# Patient Record
Sex: FEMALE | URBAN - METROPOLITAN AREA
[De-identification: names, ages, dates, MRNs, and addresses within clinical notes are randomized per-mention and may not be internally consistent; named-entity substitution may affect disease eponyms.]

---

## 2021-06-29 ENCOUNTER — OFFICE VISIT (OUTPATIENT)
Dept: URBAN - METROPOLITAN AREA CLINIC 68 | Facility: CLINIC | Age: 86
End: 2021-06-29
Payer: MEDICARE

## 2021-06-29 DIAGNOSIS — H35.3211 EXDTVE AGE-REL MCLR DEGN, RIGHT EYE, WITH ACTV CHRDL NEOVAS: Primary | ICD-10-CM

## 2021-06-29 DIAGNOSIS — H43.813 VITREOUS DEGENERATION, BILATERAL: ICD-10-CM

## 2021-06-29 PROCEDURE — 92134 CPTRZ OPH DX IMG PST SGM RTA: CPT | Performed by: OPHTHALMOLOGY

## 2021-06-29 PROCEDURE — 99204 OFFICE O/P NEW MOD 45 MIN: CPT | Performed by: OPHTHALMOLOGY

## 2021-06-29 ASSESSMENT — INTRAOCULAR PRESSURE
OS: 19
OD: 15

## 2021-06-29 NOTE — IMPRESSION/PLAN
Impression: Macular degeneration, regressed OD
s/p Avastin last 11/2020 (Dr. Car Crane) Plan: The patient is a poor historian. She reports getting an infection in the past, and has not received injections shalini 11/2020. Exam and OCT reveal a PED. Observe at this time. Thanks, [employer] Return in 2 months for follow up, OCT OU

## 2021-08-24 ENCOUNTER — OFFICE VISIT (OUTPATIENT)
Dept: URBAN - METROPOLITAN AREA CLINIC 68 | Facility: CLINIC | Age: 86
End: 2021-08-24
Payer: MEDICARE

## 2021-08-24 PROCEDURE — 92134 CPTRZ OPH DX IMG PST SGM RTA: CPT | Performed by: OPHTHALMOLOGY

## 2021-08-24 PROCEDURE — 99214 OFFICE O/P EST MOD 30 MIN: CPT | Performed by: OPHTHALMOLOGY

## 2021-08-24 ASSESSMENT — INTRAOCULAR PRESSURE
OS: 13
OD: 14

## 2021-08-24 NOTE — IMPRESSION/PLAN
Impression: Macular degeneration, regressed OD
s/p Avastin last 11/2020 (Dr. Manuel Rodriguez) Plan: The patient is a poor historian. She reports getting an infection in the past, and has not received injections since 11/2020. Exam and OCT reveal a PED. Observe at this time. Thanks, [employer] Return in 3 months for follow up, OCT OU

## 2021-10-19 ENCOUNTER — OFFICE VISIT (OUTPATIENT)
Dept: URBAN - METROPOLITAN AREA CLINIC 68 | Facility: CLINIC | Age: 86
End: 2021-10-19
Payer: MEDICARE

## 2021-10-19 DIAGNOSIS — H26.9 CATARACT: ICD-10-CM

## 2021-10-19 DIAGNOSIS — H35.3122 NEXDTVE AGE-RELATED MCLR DEGN, LEFT EYE, INTERMED DRY STAGE: ICD-10-CM

## 2021-10-19 DIAGNOSIS — H04.123 DRY EYE SYNDROME OF BILATERAL LACRIMAL GLANDS: ICD-10-CM

## 2021-10-19 PROCEDURE — 92134 CPTRZ OPH DX IMG PST SGM RTA: CPT | Performed by: OPHTHALMOLOGY

## 2021-10-19 PROCEDURE — 99214 OFFICE O/P EST MOD 30 MIN: CPT | Performed by: OPHTHALMOLOGY

## 2021-10-19 ASSESSMENT — INTRAOCULAR PRESSURE
OS: 12
OD: 11

## 2021-10-19 NOTE — IMPRESSION/PLAN
Impression: Macular degeneration, regressed OD
s/p Avastin last 11/2020 (Dr. Jed Suggs) Plan: The patient is a poor historian. She reports getting an infection in the past, and has not received injections since 11/2020. Exam and OCT reveal a PED. Observe at this time. Thanks, [employer] Return in 4 months for follow up, OCT OU

## 2022-02-22 ENCOUNTER — OFFICE VISIT (OUTPATIENT)
Dept: URBAN - METROPOLITAN AREA CLINIC 68 | Facility: CLINIC | Age: 87
End: 2022-02-22
Payer: MEDICARE

## 2022-02-22 PROCEDURE — 92134 CPTRZ OPH DX IMG PST SGM RTA: CPT | Performed by: OPHTHALMOLOGY

## 2022-02-22 PROCEDURE — 99214 OFFICE O/P EST MOD 30 MIN: CPT | Performed by: OPHTHALMOLOGY

## 2022-02-22 ASSESSMENT — INTRAOCULAR PRESSURE
OS: 13
OD: 13

## 2022-02-22 NOTE — IMPRESSION/PLAN
Impression: Macular degeneration, regressed OD
s/p Avastin last 11/2020 (Dr. Lester Ray) Plan: No interval worsening. The patient is a poor historian. She reports getting an infection in the past, and has not received injections since 11/2020. Exam and OCT reveal a PED. Observe at this time. Thanks, [employer] Return in 4 months for follow up, OCT OU

## 2023-01-24 ENCOUNTER — OFFICE VISIT (OUTPATIENT)
Facility: LOCATION | Age: 88
End: 2023-01-24
Payer: MEDICARE

## 2023-01-24 DIAGNOSIS — H35.3122 NEXDTVE AGE-RELATED MCLR DEGN, LEFT EYE, INTERMED DRY STAGE: ICD-10-CM

## 2023-01-24 DIAGNOSIS — H35.3211 EXDTVE AGE-REL MCLR DEGN, RIGHT EYE, WITH ACTV CHRDL NEOVAS: Primary | ICD-10-CM

## 2023-01-24 DIAGNOSIS — H26.9 CATARACT: ICD-10-CM

## 2023-01-24 DIAGNOSIS — H04.123 DRY EYE SYNDROME OF BILATERAL LACRIMAL GLANDS: ICD-10-CM

## 2023-01-24 DIAGNOSIS — H43.813 VITREOUS DEGENERATION, BILATERAL: ICD-10-CM

## 2023-01-24 PROCEDURE — 92134 CPTRZ OPH DX IMG PST SGM RTA: CPT | Performed by: OPHTHALMOLOGY

## 2023-01-24 PROCEDURE — 99214 OFFICE O/P EST MOD 30 MIN: CPT | Performed by: OPHTHALMOLOGY

## 2023-01-24 ASSESSMENT — INTRAOCULAR PRESSURE
OS: 13
OD: 15

## 2023-01-24 NOTE — IMPRESSION/PLAN
Impression: Macular degeneration, regressed OD
s/p Avastin last 11/2020 (Dr. Jed Suggs) Plan: No interval worsening. The patient is a poor historian. She reports getting an infection in the past, and has not received injections since 11/2020. Exam and OCT reveal a PED. Observe at this time. Thanks, [employer] Return in 4 months for follow up, OCT OU, then in 1-2 years if stable

## 2023-04-04 ENCOUNTER — OFFICE VISIT (OUTPATIENT)
Facility: LOCATION | Age: 88
End: 2023-04-04
Payer: MEDICARE

## 2023-04-04 DIAGNOSIS — H43.813 VITREOUS DEGENERATION, BILATERAL: ICD-10-CM

## 2023-04-04 DIAGNOSIS — H35.3122 NEXDTVE AGE-RELATED MCLR DEGN, LEFT EYE, INTERMED DRY STAGE: ICD-10-CM

## 2023-04-04 DIAGNOSIS — H04.123 DRY EYE SYNDROME OF BILATERAL LACRIMAL GLANDS: ICD-10-CM

## 2023-04-04 DIAGNOSIS — H35.3211 EXDTVE AGE-REL MCLR DEGN, RIGHT EYE, WITH ACTV CHRDL NEOVAS: Primary | ICD-10-CM

## 2023-04-04 DIAGNOSIS — H26.9 CATARACT: ICD-10-CM

## 2023-04-04 PROCEDURE — 92134 CPTRZ OPH DX IMG PST SGM RTA: CPT | Performed by: OPHTHALMOLOGY

## 2023-04-04 PROCEDURE — 92014 COMPRE OPH EXAM EST PT 1/>: CPT | Performed by: OPHTHALMOLOGY

## 2023-04-04 ASSESSMENT — INTRAOCULAR PRESSURE
OD: 14
OS: 13

## 2023-04-04 NOTE — IMPRESSION/PLAN
Impression: Macular degeneration, dry OS. Plan: Exam and OCT reveal no IRF. Observe. Discussed AREDS carotenoids and amsler grid.

## 2023-04-04 NOTE — IMPRESSION/PLAN
Impression: Macular degeneration, regressed OD
s/p Avastin last 11/2020 (Dr. Nigel Michael) Plan: No interval worsening. The patient is a poor historian. She reports getting an infection in the past, and has not received injections since 11/2020. Exam and OCT reveal a PED. Observe at this time. Thanks, [employer] Return in 24 months for follow up, OCT OU